# Patient Record
Sex: FEMALE | Race: ASIAN | NOT HISPANIC OR LATINO | Employment: FULL TIME
[De-identification: names, ages, dates, MRNs, and addresses within clinical notes are randomized per-mention and may not be internally consistent; named-entity substitution may affect disease eponyms.]

---

## 2017-07-08 ENCOUNTER — HEALTH MAINTENANCE LETTER (OUTPATIENT)
Age: 32
End: 2017-07-08

## 2020-10-12 ENCOUNTER — VIRTUAL VISIT (OUTPATIENT)
Dept: FAMILY MEDICINE | Facility: OTHER | Age: 35
End: 2020-10-12

## 2020-10-12 NOTE — PROGRESS NOTES
"Date: 10/12/2020 11:24:22  Clinician: Juan Alberto Saavedra  Clinician NPI: [id number]  Patient: Guille Abel  Patient : 1985  Patient Address: [address] [place] [address]  Patient Phone: [phone]  Visit Protocol: URI  Patient Summary:  Guille is a 35 year old ( : 1985 ) female who initiated a [facility] Visit for COVID-19 (Coronavirus) evaluation and screening. When asked the question \"Please sign me up to receive news, health information and promotions from [facility].\", Guille responded \"No\".    When asked when her symptoms started, Guille reported that she does not have any symptoms.   She denies taking antibiotic medication in the past month and having recent facial or sinus surgery in the past 60 days.    Pertinent COVID-19 (Coronavirus) information  In the past 14 days, Guille has not worked in a congregate living setting.   She does not work or volunteer as healthcare worker or a  and does not work or volunteer in a healthcare facility.   Guille also has not lived in a congregate living setting in the past 14 days. She does not live with a healthcare worker.   Guille has had a close contact with a laboratory-confirmed COVID-19 patient in the last 14 days. Additional information about contact with COVID-19 (Coronavirus) patient as reported by the patient (free text): My mother-in-law tested positive for COVID-19 on , Oct 11th. I was in contact with her days leading up to it.   Patient reported they are not living in the same household with a COVID-19 positive patient.  Patient was in an enclosed space for greater than 15 minutes with a COVID-19 patient.  Since 2019, Guille and has not had upper respiratory infection or influenza-like illness. Has not been diagnosed with lab-confirmed COVID-19 test   Pertinent medical history  Guille does not get yeast infections when she takes antibiotics.   Guille needs a return to work/school note.   Weight: 120 lbs   Guille does not smoke or use smokeless " tobacco.   She denies pregnancy and denies breastfeeding. She has menstruated in the past month.   Weight: 120 lbs    MEDICATIONS: No current medications, ALLERGIES: NKDA  Clinician Response:  Dear Guille,   Based on your exposure to COVID-19 (coronavirus), we would like to test you for this virus.  1. Please call [phone] to schedule your visit. Explain that you were referred by [facility] to have a COVID-19 test. Be ready to share your [facility] visit ID number.  The following will serve as your written order for this COVID Test, ordered by me, for the indication of suspected COVID [Z20.828]: The test will be ordered in [employer], our electronic health record, after you are scheduled. It will show as ordered and authorized by Lanre Dwyer MD.  Order: COVID-19 (coronavirus) PCR for ASYMPTOMATIC EXPOSURE testing from [facility].  If you know you have had close contact with someone who tested positive, you should be quarantined for 14 days after this exposure. You should stay in quarantine for the14 days even if the covid test is negative, the optimal time to test after exposure is 5-7 days from the exposure  Quarantine means   What should I do?  For safety, it's very important to follow these rules. Do this for 14 days after the date you were last exposed to the virus..  Stay home and away from others. Don't go to school or anywhere else. Generally quarantine means staying home from work but there are some exceptions to this. Please contact your workplace.   No hugging, kissing or shaking hands.  Don't let anyone visit.  Cover your mouth and nose with a mask, tissue or washcloth to avoid spreading germs.  Wash your hands and face often. Use soap and water.  What are the symptoms of COVID-19?  The most common symptoms are cough, fever and trouble breathing. Less common symptoms include headache, body aches, fatigue (feeling very tired), chills, sore throat, stuffy or runny nose, diarrhea (loose poop), loss of taste or smell, belly  pain, and nausea or vomiting (feeling sick to your stomach or throwing up).  After 14 days, if you have still don't have symptoms, you likely don't have this virus.  If you develop symptoms, follow these guidelines.  If you're normally healthy: Please start another [facility] visit to report your symptoms. Go to [email or web address].  If you have a serious health problem (like cancer, heart failure, an organ transplant or kidney disease): Call your specialty clinic. Let them know that you might have COVID-19.  2. When it's time for your COVID test:  Stay at least 6 feet away from others. (If someone will drive you to your test, stay in the backseat, as far away from the  as you can.)  Cover your mouth and nose with a mask, tissue or washcloth.  Go straight to the testing site. Don't make any stops on the way there or back.  Please note  Caregivers in these groups are at risk for severe illness due to COVID-19:  o People 65 years and older  o People who live in a nursing home or long-term care facility  o People with chronic disease (lung, heart, cancer, diabetes, kidney, liver, immunologic)  o People who have a weakened immune system, including those who:  Are in cancer treatment  Take medicine that weakens the immune system, such as corticosteroids  Had a bone marrow or organ transplant  Have an immune deficiency  Have poorly controlled HIV or AIDS  Are obese (body mass index of 40 or higher)  Smoke regularly  Where can I get more information?  [facility] -- About COVID-19: [email or web address]  CDC -- What to Do If You're Sick: www.cdc.gov/coronavirus/2019-ncov/about/steps-when-sick.html  CDC -- Ending Home Isolation: www.cdc.gov/coronavirus/2019-ncov/hcp/disposition-in-home-patients.html  CDC -- Caring for Someone: www.cdc.gov/coronavirus/2019-ncov/if-you-are-sick/care-for-someone.html  [facility] -- Interim Guidance for Hospital Discharge to Home:  www.health.[facility].[place]/diseases/coronavirus/hcp/hospdischarge.pdf  [facility] clinical trials (COVID-19 research studies): clinicalaffairs.[facility]/umn-clinical-trials  Below are the COVID-19 hotlines at the [facility] ([facility]). Interpreters are available.  For health questions: Call 268-360-6959 or 1-798.607.6281 (7 a.m. to 7 p.m.)  For questions about schools and childcare: Call 970-383-4409 or 1-503.975.1064 (7 a.m. to 7 p.m.)    Diagnosis: Contact with and (suspected) exposure to other viral communicable diseases  Diagnosis ICD: Z20.828

## 2021-10-06 ENCOUNTER — PRENATAL OFFICE VISIT (OUTPATIENT)
Dept: OBGYN | Facility: CLINIC | Age: 36
End: 2021-10-06
Payer: COMMERCIAL

## 2021-10-06 VITALS
DIASTOLIC BLOOD PRESSURE: 66 MMHG | SYSTOLIC BLOOD PRESSURE: 99 MMHG | OXYGEN SATURATION: 98 % | BODY MASS INDEX: 23.96 KG/M2 | HEIGHT: 62 IN | HEART RATE: 71 BPM | WEIGHT: 130.2 LBS

## 2021-10-06 DIAGNOSIS — Z34.81 ENCOUNTER FOR SUPERVISION OF OTHER NORMAL PREGNANCY, FIRST TRIMESTER: Primary | ICD-10-CM

## 2021-10-06 DIAGNOSIS — O09.521 MULTIGRAVIDA OF ADVANCED MATERNAL AGE IN FIRST TRIMESTER: ICD-10-CM

## 2021-10-06 DIAGNOSIS — O21.0 HYPEREMESIS GRAVIDARUM: ICD-10-CM

## 2021-10-06 LAB
ABO/RH(D): NORMAL
ANTIBODY SCREEN: NEGATIVE
ERYTHROCYTE [DISTWIDTH] IN BLOOD BY AUTOMATED COUNT: 12.1 % (ref 10–15)
HCT VFR BLD AUTO: 38.3 % (ref 35–47)
HGB BLD-MCNC: 13.2 G/DL (ref 11.7–15.7)
MCH RBC QN AUTO: 31 PG (ref 26.5–33)
MCHC RBC AUTO-ENTMCNC: 34.5 G/DL (ref 31.5–36.5)
MCV RBC AUTO: 90 FL (ref 78–100)
PLATELET # BLD AUTO: 208 10E3/UL (ref 150–450)
RBC # BLD AUTO: 4.26 10E6/UL (ref 3.8–5.2)
SPECIMEN EXPIRATION DATE: NORMAL
WBC # BLD AUTO: 5.6 10E3/UL (ref 4–11)

## 2021-10-06 PROCEDURE — 99207 PR FIRST OB VISIT: CPT | Performed by: OBSTETRICS & GYNECOLOGY

## 2021-10-06 PROCEDURE — 36415 COLL VENOUS BLD VENIPUNCTURE: CPT | Performed by: OBSTETRICS & GYNECOLOGY

## 2021-10-06 PROCEDURE — 85027 COMPLETE CBC AUTOMATED: CPT | Performed by: OBSTETRICS & GYNECOLOGY

## 2021-10-06 PROCEDURE — 87389 HIV-1 AG W/HIV-1&-2 AB AG IA: CPT | Performed by: OBSTETRICS & GYNECOLOGY

## 2021-10-06 PROCEDURE — 86900 BLOOD TYPING SEROLOGIC ABO: CPT | Performed by: OBSTETRICS & GYNECOLOGY

## 2021-10-06 PROCEDURE — 86803 HEPATITIS C AB TEST: CPT | Performed by: OBSTETRICS & GYNECOLOGY

## 2021-10-06 PROCEDURE — 86850 RBC ANTIBODY SCREEN: CPT | Performed by: OBSTETRICS & GYNECOLOGY

## 2021-10-06 PROCEDURE — 86901 BLOOD TYPING SEROLOGIC RH(D): CPT | Performed by: OBSTETRICS & GYNECOLOGY

## 2021-10-06 PROCEDURE — 87086 URINE CULTURE/COLONY COUNT: CPT | Performed by: OBSTETRICS & GYNECOLOGY

## 2021-10-06 PROCEDURE — 87591 N.GONORRHOEAE DNA AMP PROB: CPT | Performed by: OBSTETRICS & GYNECOLOGY

## 2021-10-06 PROCEDURE — 87491 CHLMYD TRACH DNA AMP PROBE: CPT | Performed by: OBSTETRICS & GYNECOLOGY

## 2021-10-06 PROCEDURE — 86780 TREPONEMA PALLIDUM: CPT | Performed by: OBSTETRICS & GYNECOLOGY

## 2021-10-06 PROCEDURE — 87340 HEPATITIS B SURFACE AG IA: CPT | Performed by: OBSTETRICS & GYNECOLOGY

## 2021-10-06 PROCEDURE — 86762 RUBELLA ANTIBODY: CPT | Performed by: OBSTETRICS & GYNECOLOGY

## 2021-10-06 RX ORDER — ONDANSETRON 4 MG/1
4 TABLET, FILM COATED ORAL EVERY 6 HOURS PRN
Qty: 30 TABLET | Refills: 1 | Status: SHIPPED | OUTPATIENT
Start: 2021-10-06 | End: 2021-11-10 | Stop reason: ALTCHOICE

## 2021-10-06 ASSESSMENT — PAIN SCALES - GENERAL: PAINLEVEL: NO PAIN (0)

## 2021-10-06 ASSESSMENT — MIFFLIN-ST. JEOR: SCORE: 1243.32

## 2021-10-06 NOTE — PROGRESS NOTES
"Guille is a 36 year old female, , B+ blood type, who is here today for her first OB visit but is unsure of her LMP.  She has had a positive home pregnancy test.  Her last recorded LMP was on 2021 but she cannot recall if she forgot to write down her August LMP or if she failed to have a period.  Therefore, will check an OB US next Monday to confirm dates and viability.  She is very nervous about having another miscarriage since this occurred in 2020 at around 4 weeks gestation.  So she requests to skip the pap and cervical cultures today since this could cause spotting and subsequent stress.  She is taking a PNV daily po and denies use of nicotine, etoh, or illicit drug abuse.  She c/o daily nausea so would like a script for an antiemetic sent to her pharmacy for relief.    ROS: Ten point review of systems was reviewed and negative except the above.    Gyn Hx:      Past Medical History:   Diagnosis Date     Acne     resolved     Past Surgical History:   Procedure Laterality Date     NO HISTORY OF SURGERY       Patient Active Problem List   Diagnosis     CARDIOVASCULAR SCREENING; LDL GOAL LESS THAN 160     Encounter for supervision of other normal pregnancy     Insufficient prenatal care     Anemia of mother, complicating pregnancy, childbirth, or the puerperium, unspecified as to episode of care(648.20)     Multigravida of advanced maternal age in first trimester       ALL/Meds: Her medication and allergy histories were reviewed and are documented in their appropriate chart areas.    SH: Reviewed and documented in the appropriate area of the chart.    FH: Her family history was reviewed and documented in its appropriate chart area.    PE: BP 99/66 (BP Location: Left arm, Patient Position: Chair, Cuff Size: Adult Regular)   Pulse 71   Ht 1.582 m (5' 2.28\")   Wt 59.1 kg (130 lb 3.2 oz)   LMP  (LMP Unknown)   SpO2 98%   Breastfeeding No   BMI 23.60 kg/m    Body mass index is 23.6 kg/m .    Urine " HCG was +  Hrt - RRR without murmur  Lungs - CTAB  Neck - supple without palpable mass  Breasts - patient not undressed  Abd - soft, gravid, nontender, unable to hear fhts with the doppler today  Pelvic - normal external female genitalia, normal vaginal mucosa, closed cervix without motion tenderness, gravid uterus beneath the pubic symphysis, no adnexal mass or tenderness  Ext - negative    A/P:   - I discussed with her nutrition and medication concerns related to pregnancy.  We discussed folic acid supplementation.  We reviewed prenatal care.  She is given the opportunity to ask questions and have them answered.    30 minutes were spent face to face with the patient today discussing her history, diagnosis, and follow-up plan as noted above.  Over 50% of the visit was spent in counseling and coordination of care.  Schedule an OB US to confirm her dates and fetal viability.  Refer to the genetic counselor at the [facility] office to discuss her genetic testing option due to her AMA status.  Send to lab for testing.  Sent a script for Zofran due to her c/o hyperemesis gravidarum - instructions on use were reviewed.  She is to continue taking a PNV daily po.  She prefers to hold off on collecting a pap smear - will plan to obtain this at her 6-week postpartum visit.    Total Visit Time: 30 minutes.    Orders Placed This Encounter   Procedures     US OB < 14 Weeks Single     CBC with platelets     Hepatitis B surface antigen     Hepatitis C antibody     HIV Antigen Antibody Combo     Rubella Antibody IgG     Treponema Abs w Reflex to RPR and Titer     Perinatology Referral     Adult Type and Screen     Ranjana Gamboa DO  FACOG, FACS

## 2021-10-06 NOTE — PATIENT INSTRUCTIONS
If you have any questions regarding your visit, Please contact your care team.    [employer] [facility]: [phone]      [facility] CLINIC HOURS TELEPHONE NUMBER   Ranjana Gamboa DO.    Mini -  Paty -     PRABHJOT Clark RN     Monday, Wednesday, [facility]  8:30a.m-5:00 p.m   [facility]  [address] [place] [address]  [phone] ask for [facility]    Imaging [phone]       Urgent Care locations:    [facility]   Monday-Friday   10 am - 8 pm  Saturday and Sunday   9 am - 5 pm    Monday-Friday   10 am - 8 pm  Saturday and Sunday   9 am - 5 pm   [phone]     [facility] Labor and Delivery:  [phone]    If you need a medication refill, please contact your pharmacy. Please allow 3 business days for your refill to be completed.  As always, Thank you for trusting us with your healthcare needs!

## 2021-10-07 LAB
C TRACH DNA SPEC QL NAA+PROBE: NEGATIVE
HBV SURFACE AG SERPL QL IA: NONREACTIVE
HCV AB SERPL QL IA: NONREACTIVE
HIV 1+2 AB+HIV1 P24 AG SERPL QL IA: NONREACTIVE
N GONORRHOEA DNA SPEC QL NAA+PROBE: NEGATIVE
RUBV IGG SERPL QL IA: 0.92 INDEX
RUBV IGG SERPL QL IA: ABNORMAL
T PALLIDUM AB SER QL: NONREACTIVE

## 2021-10-08 LAB — BACTERIA UR CULT: NO GROWTH

## 2021-10-11 ENCOUNTER — ANCILLARY PROCEDURE (OUTPATIENT)
Dept: ULTRASOUND IMAGING | Facility: CLINIC | Age: 36
End: 2021-10-11
Attending: OBSTETRICS & GYNECOLOGY
Payer: COMMERCIAL

## 2021-10-11 PROCEDURE — 76817 TRANSVAGINAL US OBSTETRIC: CPT | Performed by: RADIOLOGY

## 2021-10-11 PROCEDURE — 76801 OB US < 14 WKS SINGLE FETUS: CPT | Performed by: RADIOLOGY

## 2021-10-28 ENCOUNTER — TELEPHONE (OUTPATIENT)
Dept: OBGYN | Facility: CLINIC | Age: 36
End: 2021-10-28

## 2021-11-10 ENCOUNTER — PRENATAL OFFICE VISIT (OUTPATIENT)
Dept: OBGYN | Facility: CLINIC | Age: 36
End: 2021-11-10
Payer: COMMERCIAL

## 2021-11-10 VITALS
OXYGEN SATURATION: 99 % | WEIGHT: 125.8 LBS | HEART RATE: 75 BPM | BODY MASS INDEX: 22.8 KG/M2 | SYSTOLIC BLOOD PRESSURE: 105 MMHG | DIASTOLIC BLOOD PRESSURE: 67 MMHG

## 2021-11-10 DIAGNOSIS — Z23 NEED FOR PROPHYLACTIC VACCINATION AND INOCULATION AGAINST INFLUENZA: ICD-10-CM

## 2021-11-10 DIAGNOSIS — O21.0 HYPEREMESIS GRAVIDARUM: Primary | ICD-10-CM

## 2021-11-10 PROCEDURE — 90471 IMMUNIZATION ADMIN: CPT | Performed by: OBSTETRICS & GYNECOLOGY

## 2021-11-10 PROCEDURE — 90686 IIV4 VACC NO PRSV 0.5 ML IM: CPT | Performed by: OBSTETRICS & GYNECOLOGY

## 2021-11-10 PROCEDURE — 99207 PR PRENATAL VISIT: CPT | Performed by: OBSTETRICS & GYNECOLOGY

## 2021-11-10 RX ORDER — METOCLOPRAMIDE 10 MG/1
10 TABLET ORAL 4 TIMES DAILY PRN
Qty: 30 TABLET | Refills: 1 | Status: SHIPPED | OUTPATIENT
Start: 2021-11-10 | End: 2022-05-25

## 2021-11-10 NOTE — LETTER
[facility] [place]  [address]  [facility] [address]  Phone: [phone]    11/10/21    Guille Abel  [address]  [facility] [address]      To whom it may concern:     Guille Abel has been advised to avoid upcoming travel due to medical reasons so will need to cancel her trip, including her airline tickets, from March 9 through March 15, 2022.  Please call if you have any questions or concerns.      Sincerely,        Ranjana Gamboa DO  FACOG, FACS  Dept of OB/GYN  [phone]

## 2021-11-10 NOTE — PATIENT INSTRUCTIONS
If you have any questions regarding your visit, Please contact your care team.    [employer] [facility]: [phone]      [facility] CLINIC HOURS TELEPHONE NUMBER   Ranjana Gamboa .    SALVADOR Fisher -  Paty -       PRABHJOT Clark, RN  Rebecca, RN     Monday, Wednesday, Thursday and Friday, [place]  8:30a.m-5:00 p.m   [facility]  [address] [place] [address]  [phone] ask for [facility]    Imaging [phone]       Urgent Care locations:    [facility] Saturday and Sunday   9 am - 5 pm    Monday-Friday   12 pm - 8 pm  Saturday and Sunday   9 am - 5 pm   [phone]     [facility] Labor and Delivery:  [phone]    If you need a medication refill, please contact your pharmacy. Please allow 3 business days for your refill to be completed.  As always, Thank you for trusting us with your healthcare needs!

## 2021-11-10 NOTE — PROGRESS NOTES
She has not felt fetal movement yet but will record when this starts.  She would like to switch from Zofran to Reglan to see if this new medication will provide more relief from nausea.  She lost 5 lbs since her last visit because of this issue.  She has an appt with the genetic counselor scheduled due to AMA status and would like a flu vaccine today.  She will be due for the Moderna booster at her next prenatal visit and would like this.  She also requests a letter to obtain a refund for her airline tickets and trip to the [place] for March 9-15, 2022 since she is not sure that she will be comfortable taking an international trip at that time.  She denies any fluid leakage or regular uterine contractions.

## 2021-11-22 ENCOUNTER — TELEPHONE (OUTPATIENT)
Dept: OBGYN | Facility: CLINIC | Age: 36
End: 2021-11-22
Payer: COMMERCIAL

## 2021-11-22 NOTE — TELEPHONE ENCOUNTER
Patient is calling today with questions about the covid booster shot. She would like a call back as what Dr. Gamboa would recommend. Please advise. Thank you.

## 2021-11-23 NOTE — TELEPHONE ENCOUNTER
Unable to reach patient via phone. RN left a message and instructed patient to call the clinic at [phone].    Rebecca Mar RN on 11/23/2021 at 8:00 AM

## 2021-11-23 NOTE — TELEPHONE ENCOUNTER
Let pt know that our providers encourage all of their pregnant pt's to get the Covid booster shot.  Pt was wondering if she needed to stick with the Moderna booster since her Covid shots were Moderna.  I explained that we follow the CDCs guidelines and she can have whichever Covid booster, moderna or Pfizer.    Amber Aldridge RN

## 2021-12-08 ENCOUNTER — PRENATAL OFFICE VISIT (OUTPATIENT)
Dept: OBGYN | Facility: CLINIC | Age: 36
End: 2021-12-08
Payer: COMMERCIAL

## 2021-12-08 VITALS
WEIGHT: 126.2 LBS | DIASTOLIC BLOOD PRESSURE: 65 MMHG | SYSTOLIC BLOOD PRESSURE: 99 MMHG | HEART RATE: 65 BPM | BODY MASS INDEX: 22.87 KG/M2

## 2021-12-08 DIAGNOSIS — O09.522 MULTIGRAVIDA OF ADVANCED MATERNAL AGE IN SECOND TRIMESTER: ICD-10-CM

## 2021-12-08 PROCEDURE — 99207 PR PRENATAL VISIT: CPT | Performed by: OBSTETRICS & GYNECOLOGY

## 2021-12-08 NOTE — PATIENT INSTRUCTIONS
If you have any questions regarding your visit, Please contact your care team.    [employer] [facility]: [phone]      [facility] CLINIC HOURS TELEPHONE NUMBER   Ranjana Gamboa DO.    CABRERA Russell -  Paty -       Amber RN  Soniya, RN  Rebecca, RN     Monday, Wednesday, Thursday and Friday, [place]  8:30a.m-5:00 p.m   [facility]  [address] [place] [address]  [phone] ask for [facility]    Imaging [phone]       Urgent Care locations:    [facility] Saturday and Sunday   9 am - 5 pm    Monday-Friday   12 pm - 8 pm  Saturday and Sunday   9 am - 5 pm   [phone]     [facility] Labor and Delivery:  [phone]    If you need a medication refill, please contact your pharmacy. Please allow 3 business days for your refill to be completed.  As always, Thank you for trusting us with your healthcare needs!

## 2021-12-08 NOTE — PROGRESS NOTES
She has not felt fetal movement yet but will record when this begins.  She gained 1 lb since her last visit and denies any fluid leakage or regular uterine contractions.  She is measuring small for dates today and will be due for her level 2 OB US in 4 weeks 5 days so a referral to [facility] was placed.  She feels better and only takes Reglan prn/occassionally now for nausea.  She declines a med refill today.  She received her Moderna COVID-19 booster on 11/29/2021.

## 2022-01-04 ENCOUNTER — TRANSFERRED RECORDS (OUTPATIENT)
Dept: HEALTH INFORMATION MANAGEMENT | Facility: CLINIC | Age: 37
End: 2022-01-04
Payer: COMMERCIAL

## 2022-02-02 ENCOUNTER — PRENATAL OFFICE VISIT (OUTPATIENT)
Dept: OBGYN | Facility: CLINIC | Age: 37
End: 2022-02-02
Payer: COMMERCIAL

## 2022-02-02 VITALS
DIASTOLIC BLOOD PRESSURE: 61 MMHG | OXYGEN SATURATION: 100 % | HEART RATE: 60 BPM | SYSTOLIC BLOOD PRESSURE: 94 MMHG | BODY MASS INDEX: 24.43 KG/M2 | WEIGHT: 134.8 LBS

## 2022-02-02 DIAGNOSIS — O09.892 SUPERVISION OF OTHER HIGH RISK PREGNANCIES, SECOND TRIMESTER: Primary | ICD-10-CM

## 2022-02-02 PROCEDURE — 99207 PR PRENATAL VISIT: CPT | Performed by: OBSTETRICS & GYNECOLOGY

## 2022-02-02 NOTE — PROGRESS NOTES
She reports feeling reassuring daily fetal activity and will continue to record.  She gained 8 lbs since her last visit but this was 8 weeks ago so she was reminded to come in no later than every 4 weeks.  She has a growth OB US scheduled this month per the pt with MFJOANNE, due to measuring SGA.  She states that her other babies were all small at birth.  She was reminded that the diabetic screen will be checked at her next visit in 4 weeks.  She is not a rhogam candidate since her Rh factor is present.

## 2022-02-02 NOTE — PATIENT INSTRUCTIONS
If you have any questions regarding your visit, Please contact your care team.    [employer] [facility]: [phone]      [facility] CLINIC HOURS TELEPHONE NUMBER   Ranjana Gamboa DO.    CABRERA Russell -  Paty -       Ambre RN  Soniya, RN  Rebecca, RN     Monday, Wednesday, Thursday and Friday, [place]  8:30a.m-5:00 p.m   [facility]  [address] [place] [address]  [phone] ask for [facility]    Imaging [phone]       Urgent Care locations:    [facility] Saturday and Sunday   9 am - 5 pm    Monday-Friday   12 pm - 8 pm  Saturday and Sunday   9 am - 5 pm   [phone]     [facility] Labor and Delivery:  [phone]    If you need a medication refill, please contact your pharmacy. Please allow 3 business days for your refill to be completed.  As always, Thank you for trusting us with your healthcare needs!

## 2022-03-09 ENCOUNTER — PRENATAL OFFICE VISIT (OUTPATIENT)
Dept: OBGYN | Facility: CLINIC | Age: 37
End: 2022-03-09
Payer: COMMERCIAL

## 2022-03-09 VITALS
BODY MASS INDEX: 25.07 KG/M2 | SYSTOLIC BLOOD PRESSURE: 95 MMHG | WEIGHT: 138.31 LBS | DIASTOLIC BLOOD PRESSURE: 61 MMHG | HEART RATE: 70 BPM

## 2022-03-09 DIAGNOSIS — Z28.39 RUBELLA NON-IMMUNE STATUS, ANTEPARTUM: ICD-10-CM

## 2022-03-09 DIAGNOSIS — O09.521 MULTIGRAVIDA OF ADVANCED MATERNAL AGE IN FIRST TRIMESTER: Primary | ICD-10-CM

## 2022-03-09 DIAGNOSIS — O09.899 RUBELLA NON-IMMUNE STATUS, ANTEPARTUM: ICD-10-CM

## 2022-03-09 LAB
GLUCOSE 1H P 50 G GLC PO SERPL-MCNC: 182 MG/DL (ref 70–129)
HGB BLD-MCNC: 11.4 G/DL (ref 11.7–15.7)

## 2022-03-09 PROCEDURE — 82950 GLUCOSE TEST: CPT | Performed by: ADVANCED PRACTICE MIDWIFE

## 2022-03-09 PROCEDURE — 90715 TDAP VACCINE 7 YRS/> IM: CPT | Performed by: ADVANCED PRACTICE MIDWIFE

## 2022-03-09 PROCEDURE — 90471 IMMUNIZATION ADMIN: CPT | Performed by: ADVANCED PRACTICE MIDWIFE

## 2022-03-09 PROCEDURE — 36415 COLL VENOUS BLD VENIPUNCTURE: CPT | Performed by: ADVANCED PRACTICE MIDWIFE

## 2022-03-09 PROCEDURE — 99207 PR PRENATAL VISIT: CPT | Performed by: ADVANCED PRACTICE MIDWIFE

## 2022-03-09 NOTE — PROGRESS NOTES
Feeling well. No complaints.  Has had two MFM scans and has another planned.  GCT today and TDAP.   No contra/lof/vb  Boosted for COVID and has not had it.   Employer is considering having her work hybrid.  Encouraged to stay home if possible and if not to wear N95  Discussed rubella non immunne  RTC 2 weeks.   Julieth Whaley, APRN, CNM

## 2022-03-18 ENCOUNTER — LAB (OUTPATIENT)
Dept: LAB | Facility: CLINIC | Age: 37
End: 2022-03-18
Payer: COMMERCIAL

## 2022-03-18 DIAGNOSIS — O09.521 MULTIGRAVIDA OF ADVANCED MATERNAL AGE IN FIRST TRIMESTER: ICD-10-CM

## 2022-03-18 LAB
GESTATIONAL GTT 1 HR POST DOSE: 162 MG/DL (ref 60–179)
GESTATIONAL GTT 2 HR POST DOSE: 158 MG/DL (ref 60–154)
GESTATIONAL GTT 3 HR POST DOSE: 112 MG/DL (ref 60–139)
GLUCOSE P FAST SERPL-MCNC: 83 MG/DL (ref 60–94)

## 2022-03-18 PROCEDURE — 82952 GTT-ADDED SAMPLES: CPT

## 2022-03-18 PROCEDURE — 82951 GLUCOSE TOLERANCE TEST (GTT): CPT

## 2022-03-18 PROCEDURE — 36415 COLL VENOUS BLD VENIPUNCTURE: CPT

## 2022-04-08 ENCOUNTER — PRENATAL OFFICE VISIT (OUTPATIENT)
Dept: OBGYN | Facility: CLINIC | Age: 37
End: 2022-04-08
Payer: COMMERCIAL

## 2022-04-08 VITALS
WEIGHT: 141.6 LBS | OXYGEN SATURATION: 99 % | HEART RATE: 80 BPM | BODY MASS INDEX: 25.66 KG/M2 | SYSTOLIC BLOOD PRESSURE: 102 MMHG | DIASTOLIC BLOOD PRESSURE: 68 MMHG

## 2022-04-08 PROCEDURE — 99207 PR PRENATAL VISIT: CPT | Performed by: OBSTETRICS & GYNECOLOGY

## 2022-04-08 NOTE — PROGRESS NOTES
She continues to measure SGA and is being followed by [facility] for this issue with her next appt scheduled on 4/20/2022.  She states that all her babies have been small so is most likely constitutional.  She was pleased to know that she passed her 3-hour GTT.  He gained 3 lbs since her last visit and denies any fluid leakage or regular uterine contractions.

## 2022-04-08 NOTE — PATIENT INSTRUCTIONS
If you have any questions regarding your visit, Please contact your care team.    [facility]: [phone]      [facility] CLINIC HOURS TELEPHONE NUMBER   Ranjana Gamboa .    CABRERA Russell -  Paty -       Amber RN  Soniya, RN  Rebecca, PRABHJOT     [place]  Wednesday and Friday  8:30 a.m-5:00 p.m    [place]  Monday 8:30 a.m-5:00 p.m [facility]  [address] [place] [address]  [phone] ask for [facility]    Imaging [phone]    [facility]  [address] [facility] [place] [address]     Urgent Care locations:    [facility] Saturday and Sunday   9 am - 5 pm    Monday-Friday   10 am - 8 pm  Saturday and Sunday   9 am - 5 pm   [phone]     [facility] Labor and Delivery:  [phone]    If you need a medication refill, please contact your pharmacy. Please allow 3 business days for your refill to be completed.  As always, Thank you for trusting us with your healthcare needs!   __________see additional instructions from your care team below___________

## 2022-04-20 ENCOUNTER — PRENATAL OFFICE VISIT (OUTPATIENT)
Dept: OBGYN | Facility: CLINIC | Age: 37
End: 2022-04-20
Payer: COMMERCIAL

## 2022-04-20 VITALS
HEART RATE: 87 BPM | WEIGHT: 143.7 LBS | SYSTOLIC BLOOD PRESSURE: 99 MMHG | DIASTOLIC BLOOD PRESSURE: 67 MMHG | OXYGEN SATURATION: 97 % | BODY MASS INDEX: 26.04 KG/M2

## 2022-04-20 DIAGNOSIS — O43.193 MARGINAL INSERTION OF UMBILICAL CORD AFFECTING MANAGEMENT OF MOTHER IN THIRD TRIMESTER: ICD-10-CM

## 2022-04-20 PROCEDURE — 99207 PR PRENATAL VISIT: CPT | Performed by: OBSTETRICS & GYNECOLOGY

## 2022-04-20 NOTE — PATIENT INSTRUCTIONS
If you have any questions regarding your visit, Please contact your care team.    [employer] Services: [phone]    To Schedule an Appointment 24/7  Call: [phone] CL[facility] HOU[facility] TELEPHONE NUM[facility]   Ranjana Gamboa DO.    CABRERA Russell -  Paty -       Amber, RN  Soniya, RN  Rebecca, RN     [place]  Wednesday and Friday  8:30 a.m-5:00 p.m    [place]  Monday 8:30 a.m-5:00 p.m [facility]  [address] [place] [address]  [phone] ask for [facility]    Imaging Scheduling-All Locations [phone]    [facility]  [address] [facility] [place] [address]     Urgent Care locations:  [facility]   Monday-Friday   10 am - 8 pm  Saturday and Sunday   9 am - 5 pm   [phone]     [facility] Labor and Delivery:  [phone]    If you need a medication refill, please contact your pharmacy. Please allow 3 business days for your refill to be completed.  As always, Thank you for trusting us with your healthcare needs!  see additional instructions from your care team below

## 2022-04-20 NOTE — PROGRESS NOTES
She reports feeling reassuring daily fetal activity and will continue to record.  She gained 2 lbs since her last visit and denies any fluid leakage or regular uterine contractions.  She has her next MFM appt today at 3 pm due to measuring SGA as well as because of a marginal cord insertion and AMA status.  She does have a hx of previous infants who met the SGA criteria.  Will plan to check for GBS at her next visit in 2 weeks and she is aware.

## 2022-05-06 ENCOUNTER — PRENATAL OFFICE VISIT (OUTPATIENT)
Dept: OBGYN | Facility: CLINIC | Age: 37
End: 2022-05-06
Payer: COMMERCIAL

## 2022-05-06 VITALS
BODY MASS INDEX: 26.5 KG/M2 | HEART RATE: 72 BPM | WEIGHT: 146.2 LBS | SYSTOLIC BLOOD PRESSURE: 107 MMHG | OXYGEN SATURATION: 99 % | DIASTOLIC BLOOD PRESSURE: 62 MMHG | RESPIRATION RATE: 16 BRPM

## 2022-05-06 DIAGNOSIS — O43.193 MARGINAL INSERTION OF UMBILICAL CORD AFFECTING MANAGEMENT OF MOTHER IN THIRD TRIMESTER: ICD-10-CM

## 2022-05-06 PROCEDURE — 87653 STREP B DNA AMP PROBE: CPT | Performed by: OBSTETRICS & GYNECOLOGY

## 2022-05-06 PROCEDURE — 99207 PR PRENATAL VISIT: CPT | Performed by: OBSTETRICS & GYNECOLOGY

## 2022-05-06 NOTE — PATIENT INSTRUCTIONS
If you have any questions regarding your visit, Please contact your care team.    [employer] Services: [phone]    To Schedule an Appointment 24/7  Call: [phone] CL[facility] TELEPHONE NUM[facility]   Ranjana Gamboa DO.    CABRERA Russell -  Paty -       Amber, RN  Soniya, RN  Rebecca, RN     [place]  Wednesday and Friday  8:30 a.m-5:00 p.m    [place]  Monday 8:30 a.m-5:00 p.m [facility]  [address] [place] [address]  [phone] ask for [facility]    Imaging Scheduling-All Locations [phone]    [facility]  [address] [facility] [place] [address]     Urgent Care locations:  [facility]   Monday-Friday   10 am - 8 pm  Saturday and Sunday   9 am - 5 pm   [phone]     [facility] Labor and Delivery:  [phone]    If you need a medication refill, please contact your pharmacy. Please allow 3 business days for your refill to be completed.  As always, Thank you for trusting us with your healthcare needs!  see additional instructions from your care team below

## 2022-05-06 NOTE — PROGRESS NOTES
She reports feeling reassuring daily fetal activity and will continue to record.  She gained 3 lbs since her last visit and denies any fluid leakage or regular uterine contractions.  She has an upcoming follow up appt with MAREK on 5/19/2022 and continues to measure SGA.  However, all three of her infants measured SGA so she is not worried.  She also is aware that she has a marginal cord insertion.  Her GBS culture was obtained and submitted today.  Her cervix remains unchanged and unfavorable.

## 2022-05-07 LAB — GP B STREP DNA SPEC QL NAA+PROBE: POSITIVE

## 2022-05-18 ENCOUNTER — PRENATAL OFFICE VISIT (OUTPATIENT)
Dept: OBGYN | Facility: CLINIC | Age: 37
End: 2022-05-18
Payer: COMMERCIAL

## 2022-05-18 VITALS
DIASTOLIC BLOOD PRESSURE: 68 MMHG | OXYGEN SATURATION: 98 % | HEART RATE: 73 BPM | WEIGHT: 147.3 LBS | SYSTOLIC BLOOD PRESSURE: 108 MMHG | BODY MASS INDEX: 26.7 KG/M2

## 2022-05-18 DIAGNOSIS — O09.529 SUPERVISION OF HIGH-RISK PREGNANCY OF ELDERLY MULTIGRAVIDA: Primary | ICD-10-CM

## 2022-05-18 DIAGNOSIS — O99.820 GROUP B STREPTOCOCCUS CARRIER, +RV CULTURE, CURRENTLY PREGNANT: ICD-10-CM

## 2022-05-18 DIAGNOSIS — O43.193 MARGINAL INSERTION OF UMBILICAL CORD AFFECTING MANAGEMENT OF MOTHER IN THIRD TRIMESTER: ICD-10-CM

## 2022-05-18 PROCEDURE — 99207 PR PRENATAL VISIT: CPT | Performed by: OBSTETRICS & GYNECOLOGY

## 2022-05-18 NOTE — PATIENT INSTRUCTIONS
If you have any questions regarding your visit, Please contact your care team.    [employer] Services: [phone]    To Schedule an Appointment 24/7  Call: [phone] HOU[facility] TELEPHONE NUMBER   Ranjana Gamboa DO.    CABRERA Russell -Surgery Scheduler  Paty - Surgery Scheduler      Amber, RN  Soniya, RN  Rebecca, PRABHJOT     [place]  Wednesday and Friday  8:30 a.m-5:00 p.m    [place]-Temporary  Monday 8:30 a.m-5:00 p.m    Typical Surgery day:  Tuesday [facility]  [address] [place] [address] [phone] Phone  [phone] Fax    Imaging Scheduling-All Locations [phone]    [facility]  [address] [place] [address]     Urgent Care locations:  [facility]   Monday-Friday   10 am - 8 pm  Saturday and Sunday   9 am - 5 pm   [phone]   **Surgeries** Our Surgery Schedulers will contact you to schedule. If you do not receive a call within 3 business days, please call [phone].    [facility] Labor and Delivery:  [phone]    If you need a medication refill, please contact your pharmacy. Please allow 3 business days for your refill to be completed.  As always, Thank you for trusting us with your healthcare needs!  see additional instructions from your care team below

## 2022-05-18 NOTE — PROGRESS NOTES
She reports feeling reassuring daily fetal activity and will continue to record. She gained 1 lb since her last visit and denies any fluid leakage or regular uterine contractions.  She has her next M appt tomorrow for a growth US since she has a known marginal cord insertion and has been measuring SGA.  She continues to measure SGA today but states that her other 3 babies measured SGA so she is not concerned.  She was surprised to learn that her GBS culture was + since she had not accessed My Chart yet.  We discussed this diagnosis and her need for IV antibiotic during labor.  Her cervix remains unchanged and unfavorable.

## 2022-05-25 ENCOUNTER — PRENATAL OFFICE VISIT (OUTPATIENT)
Dept: OBGYN | Facility: CLINIC | Age: 37
End: 2022-05-25
Payer: COMMERCIAL

## 2022-05-25 VITALS
DIASTOLIC BLOOD PRESSURE: 72 MMHG | HEART RATE: 66 BPM | BODY MASS INDEX: 27.05 KG/M2 | WEIGHT: 149.25 LBS | SYSTOLIC BLOOD PRESSURE: 112 MMHG

## 2022-05-25 DIAGNOSIS — O09.523 MULTIGRAVIDA OF ADVANCED MATERNAL AGE IN THIRD TRIMESTER: Primary | ICD-10-CM

## 2022-05-25 PROCEDURE — 99207 PR PRENATAL VISIT: CPT | Performed by: ADVANCED PRACTICE MIDWIFE

## 2022-05-25 NOTE — PROGRESS NOTES
39w2d  Feels ok.  Some left sided pain with walking.   Discussed induction at 41 weeks.   Had MFM scan last week with adequate growth.   Fetal movement: positive  Denies vaginal bleeding/lof, some contractions  Warning signs reviewed   Labor signs and symptoms discussed    BPP ordered for next week.   Return to clinic 1 week    Julieth Wahley, DON, BRIGIDM

## 2022-06-18 ENCOUNTER — HEALTH MAINTENANCE LETTER (OUTPATIENT)
Age: 37
End: 2022-06-18

## 2022-07-20 ENCOUNTER — PRENATAL OFFICE VISIT (OUTPATIENT)
Dept: OBGYN | Facility: CLINIC | Age: 37
End: 2022-07-20
Payer: COMMERCIAL

## 2022-07-20 VITALS
OXYGEN SATURATION: 97 % | DIASTOLIC BLOOD PRESSURE: 65 MMHG | WEIGHT: 130 LBS | BODY MASS INDEX: 23.56 KG/M2 | HEART RATE: 69 BPM | SYSTOLIC BLOOD PRESSURE: 100 MMHG

## 2022-07-20 DIAGNOSIS — Z12.4 SCREENING FOR CERVICAL CANCER: ICD-10-CM

## 2022-07-20 PROCEDURE — 87624 HPV HI-RISK TYP POOLED RSLT: CPT | Performed by: OBSTETRICS & GYNECOLOGY

## 2022-07-20 PROCEDURE — 99207 PR POST PARTUM EXAM: CPT | Performed by: OBSTETRICS & GYNECOLOGY

## 2022-07-20 PROCEDURE — G0145 SCR C/V CYTO,THINLAYER,RESCR: HCPCS | Performed by: OBSTETRICS & GYNECOLOGY

## 2022-07-20 ASSESSMENT — PATIENT HEALTH QUESTIONNAIRE - PHQ9: SUM OF ALL RESPONSES TO PHQ QUESTIONS 1-9: 1

## 2022-07-20 NOTE — PATIENT INSTRUCTIONS
If you have any questions regarding your visit, Please contact your care team.    [email or web address] Services: [phone]    To Schedule an Appointment 24/7  Call: [phone] HOURS TELEPHONE NUMBER   Ranjana Gamboa DO.    CABRERA Russell -Surgery Scheduler  Paty - Surgery Scheduler    Amber, PRABHJOT Tobar, RN  Rebecca, PRABHJOT   [place]  Wednesday and Friday  8:30 a.m-5:00 p.m    [place]-Temporary  Monday 8:30 a.m-5:00 p.m  Typical Surgery day:  Tuesday [facility]  [address] [place] [address] [phone] Phone  [phone] Fax    Imaging Scheduling-All Locations [phone]    [facility]  [address] [place] [address]     Urgent Care locations:  [facility] Monday-Friday   10 am - 8 pm  Saturday and Sunday   9 am - 5 pm [phone]   **Surgeries** Our Surgery Schedulers will contact you to schedule. If you do not receive a call within 3 business days, please call [phone].    [facility] Labor and Delivery:  [phone]    If you need a medication refill, please contact your pharmacy. Please allow 3 business days for your refill to be completed.  As always, Thank you for trusting us with your healthcare needs!  see additional instructions from your care team below

## 2022-07-20 NOTE — PROGRESS NOTES
Guille is here for a postpartum checkup.  She is a 36 y/o female, , who is bottle-feeding and denies any issues after a  on 2022.  She declines a contraceptive consult today and her first menses after delivery began on 2022.    She had a   OB History    Para Term  AB Living   5 4 4 0 1 4   SAB IAB Ectopic Multiple Live Births   0 0 0 0 4      # Outcome Date GA Lbr Chavez/2nd Weight Sex Delivery Anes PTL Lv   5 Term 22 40w0d 13:09 / 00:06 2.778 kg (6 lb 2 oz) F Vag-Spont IV, EPI N CAR      Name: REJI,BABYGIRL      Apgar1: 9  Apgar5: 10   4 AB 2020           3 Term 11/12/15 40w4d  2.778 kg (6 lb 2 oz) F  None N CAR      Complications: Hemorrhage      Name: Juana      Apgar1: 8  Apgar5: 9   2 Term 12 40w0d  2.637 kg (5 lb 13 oz) F Vag-Spont   CAR      Name: Nan Friedman   1 Term 04/07/10 40w0d 24:00 2.325 kg (5 lb 2 oz) F  None  CAR      Birth Comments: none      Name: Merissa      Since delivery, she has not been breast feeding.  She has had a normal menses.  She has not had intercourse.  Patient screened for postpartum depression and complaints are NEGATIVE. Screening has also been completed for intimate partner violence. She would like to discuss obtaining a pap smear toda since she is overdue x 4 years.  She denies any hx of abnormal pap results, however.      PE: /65 (BP Location: Right arm, Patient Position: Sitting, Cuff Size: Adult Regular)   Pulse 69   Wt 59 kg (130 lb)   LMP 2022 (Exact Date)   SpO2 97%   BMI 23.56 kg/m    Body mass index is 23.56 kg/m .    General Appearance:  healthy, alert, active, no distress  Cardiovascular:  Regular rate and Rhythm without murmur  Lungs:  Clear, without wheeze, rale or rhonchi   Breasts:  The patient declined an exam of her breasts today.  Abdomen: Benign, Soft, flat, non-tender, No masses, organomegaly, No inguinal nodes and Bowel sounds normoactive.   Pelvic:       - Ext: Vulva and perineum are normal  without lesion, mass or discharge        - Bladder: no tenderness, no masses       - Vagina: Normal mucosa, blood in vault consistent with current menses       - Cervix: normal and multiparous       - Uterus:Normal shape, position and consistencyfirm, nontender, nongravid uterus without CMT       - Adnexa: Normal without masses or tenderness       - Rectal: deferred    A/P  Routine Postpartum Exam, Pap Collection for Cervical Cancer Screening     - I discussed the new pap recommendations regarding screening.  Explained the rationale for increased intervals between paps.  Questions asked and answered.  She does agree to this regiment.   - Pap was performed and submitted to lab since she is 4 years overdue for this.  Her last pap was collected on 3/27/2015.     - Contraception: She declines a consult and does not want to discuss.    30 minutes were spent today in chart review, the patient visit, review of tests, and documentation in regard to the issues noted above.    Ranjana Gamboa DO  FACOG, FACS

## 2022-07-25 LAB
BKR LAB AP GYN ADEQUACY: NORMAL
BKR LAB AP GYN INTERPRETATION: NORMAL
BKR LAB AP HPV REFLEX: NORMAL
BKR LAB AP LMP: NORMAL
BKR LAB AP PREVIOUS ABNORMAL: NORMAL
PATH REPORT.COMMENTS IMP SPEC: NORMAL
PATH REPORT.COMMENTS IMP SPEC: NORMAL
PATH REPORT.RELEVANT HX SPEC: NORMAL

## 2022-07-26 LAB
HUMAN PAPILLOMA VIRUS 16 DNA: NEGATIVE
HUMAN PAPILLOMA VIRUS 18 DNA: NEGATIVE
HUMAN PAPILLOMA VIRUS FINAL DIAGNOSIS: NORMAL
HUMAN PAPILLOMA VIRUS OTHER HR: NEGATIVE

## 2022-10-30 ENCOUNTER — HEALTH MAINTENANCE LETTER (OUTPATIENT)
Age: 37
End: 2022-10-30

## 2023-07-01 ENCOUNTER — HEALTH MAINTENANCE LETTER (OUTPATIENT)
Age: 38
End: 2023-07-01

## 2023-10-09 ENCOUNTER — OFFICE VISIT (OUTPATIENT)
Dept: FAMILY MEDICINE | Facility: CLINIC | Age: 38
End: 2023-10-09
Payer: COMMERCIAL

## 2023-10-09 VITALS
WEIGHT: 130.6 LBS | OXYGEN SATURATION: 100 % | BODY MASS INDEX: 24.03 KG/M2 | RESPIRATION RATE: 18 BRPM | HEIGHT: 62 IN | SYSTOLIC BLOOD PRESSURE: 110 MMHG | TEMPERATURE: 98.1 F | HEART RATE: 69 BPM | DIASTOLIC BLOOD PRESSURE: 73 MMHG

## 2023-10-09 DIAGNOSIS — Z00.00 ROUTINE GENERAL MEDICAL EXAMINATION AT A HEALTH CARE FACILITY: Primary | ICD-10-CM

## 2023-10-09 DIAGNOSIS — R05.2 SUBACUTE COUGH: ICD-10-CM

## 2023-10-09 PROCEDURE — 99213 OFFICE O/P EST LOW 20 MIN: CPT | Mod: 25 | Performed by: FAMILY MEDICINE

## 2023-10-09 PROCEDURE — 90480 ADMN SARSCOV2 VAC 1/ONLY CMP: CPT | Performed by: FAMILY MEDICINE

## 2023-10-09 PROCEDURE — 91320 SARSCV2 VAC 30MCG TRS-SUC IM: CPT | Performed by: FAMILY MEDICINE

## 2023-10-09 PROCEDURE — 99395 PREV VISIT EST AGE 18-39: CPT | Mod: 25 | Performed by: FAMILY MEDICINE

## 2023-10-09 RX ORDER — BENZONATATE 100 MG/1
100 CAPSULE ORAL 3 TIMES DAILY PRN
Qty: 30 CAPSULE | Refills: 0 | Status: SHIPPED | OUTPATIENT
Start: 2023-10-09 | End: 2024-05-30

## 2023-10-09 ASSESSMENT — ENCOUNTER SYMPTOMS
JOINT SWELLING: 0
HEMATOCHEZIA: 0
CONSTIPATION: 0
FEVER: 0
HEADACHES: 0
NAUSEA: 0
DYSURIA: 0
HEMATURIA: 0
WEAKNESS: 0
MYALGIAS: 0
BREAST MASS: 0
DIZZINESS: 0
SHORTNESS OF BREATH: 0
CHILLS: 0
NERVOUS/ANXIOUS: 0
PARESTHESIAS: 0
ARTHRALGIAS: 0
DIARRHEA: 0
FREQUENCY: 0
ABDOMINAL PAIN: 0
EYE PAIN: 0
COUGH: 1
PALPITATIONS: 0
SORE THROAT: 0
HEARTBURN: 0

## 2023-10-09 ASSESSMENT — PAIN SCALES - GENERAL: PAINLEVEL: NO PAIN (0)

## 2023-10-09 NOTE — PROGRESS NOTES
SUBJECTIVE:   CC: Guille is an 38 year old who presents for preventive health visit.       10/9/2023     3:59 PM   Additional Questions   Roomed by Lorena       Healthy Habits:     Getting at least 3 servings of Calcium per day:  NO    Bi-annual eye exam:  Yes    Dental care twice a year:  Yes    Sleep apnea or symptoms of sleep apnea:  None    Diet:  Regular (no restrictions)    Frequency of exercise:  2-3 days/week    Duration of exercise:  15-30 minutes    Taking medications regularly:  Yes    Medication side effects:  None    Additional concerns today:  No      Today's PHQ-2 Score:       10/9/2023     9:47 AM   PHQ-2 ( 1999 [employer])   Q1: Little interest or pleasure in doing things 0   Q2: Feeling down, depressed or hopeless 0   PHQ-2 Score 0   Q1: Little interest or pleasure in doing things Not at all   Q2: Feeling down, depressed or hopeless Not at all   PHQ-2 Score 0                   Have you ever done Advance Care Planning? (For example, a Health Directive, POLST, or a discussion with a medical provider or your loved ones about your wishes): No, advance care planning information given to patient to review.  Patient declined advance care planning discussion at this time.    Social History     Tobacco Use    Smoking status: Never    Smokeless tobacco: Never   Substance Use Topics    Alcohol use: No             10/9/2023     9:46 AM   Alcohol Use   Prescreen: >3 drinks/day or >7 drinks/week? No          No data to display              Reviewed orders with patient.  Reviewed health maintenance and updated orders accordingly - Yes  Lab work is in process    Breast Cancer Screening:        10/9/2023     9:49 AM   Breast CA Risk Assessment (FHS-7)   Do you have a family history of breast, colon, or ovarian cancer? No / Unknown         Patient under 40 years of age: Routine Mammogram Screening not recommended.   Pertinent mammograms are reviewed under the imaging tab.    History of abnormal Pap smear: NO - age 30-65  "PAP every 5 years with negative HPV co-testing recommended      Latest Ref Rng & Units 7/20/2022     3:40 PM 9/21/2009    12:00 AM   PAP / HPV   PAP  Negative for Intraepithelial Lesion or Malignancy (NILM)     PAP (Historical)   NIL    HPV 16 DNA Negative Negative     HPV 18 DNA Negative Negative     Other HR HPV Negative Negative       Reviewed and updated as needed this visit by clinical staff   Tobacco  Allergies  Meds              Reviewed and updated as needed this visit by Provider                     Review of Systems   Constitutional:  Negative for chills and fever.   HENT:  Negative for congestion, ear pain, hearing loss and sore throat.    Eyes:  Negative for pain and visual disturbance.   Respiratory:  Positive for cough. Negative for shortness of breath.    Cardiovascular:  Negative for chest pain, palpitations and peripheral edema.   Gastrointestinal:  Negative for abdominal pain, constipation, diarrhea, heartburn, hematochezia and nausea.   Breasts:  Negative for tenderness, breast mass and discharge.   Genitourinary:  Negative for dysuria, frequency, genital sores, hematuria, pelvic pain, urgency, vaginal bleeding and vaginal discharge.   Musculoskeletal:  Negative for arthralgias, joint swelling and myalgias.   Skin:  Negative for rash.   Neurological:  Negative for dizziness, weakness, headaches and paresthesias.   Psychiatric/Behavioral:  Negative for mood changes. The patient is not nervous/anxious.           OBJECTIVE:   /73 (BP Location: Left arm, Patient Position: Sitting, Cuff Size: Adult Regular)   Pulse 69   Temp 98.1  F (36.7  C) (Oral)   Resp 18   Ht 1.584 m (5' 2.36\")   Wt 59.2 kg (130 lb 9.6 oz)   LMP 10/02/2023 (Approximate)   SpO2 100%   BMI 23.61 kg/m    Physical Exam  GENERAL: healthy, alert and no distress  NECK: no adenopathy, no asymmetry, masses, or scars and thyroid normal to palpation  RESP: lungs clear to auscultation - no rales, rhonchi or wheezes  CV: " regular rate and rhythm, normal S1 S2, no S3 or S4, no murmur, click or rub, no peripheral edema and peripheral pulses strong  ABDOMEN: soft, nontender, no hepatosplenomegaly, no masses and bowel sounds normal  MS: no gross musculoskeletal defects noted, no edema    Diagnostic Test Results:  Labs reviewed in Epic    ASSESSMENT/PLAN:   (Z00.00) Routine general medical examination at a health care facility  (primary encounter diagnosis)  -Vitals stable, depression screening normal.  -Up-to-date on Pap.  Next Pap in 2027.    Plan: CBC with platelets, Comprehensive metabolic         panel (BMP + Alb, Alk Phos, ALT, AST, Total.         Bili, TP), Lipid panel reflex to direct LDL         Fasting            (R05.2) Subacute cough  -Guille with history of allergies has been having lingering cough for the past 2 to 3 weeks.  Especially at night.  Waking 1 or 2 times at night.  -Suggested imaging, Guille preferred to hold.  -Recommended to try cough suppressants at night, warm water with honey.  -If issues still persist after 2 to 3 weeks, she will let me know through MyChart to order chest x-ray.    Plan: benzonatate (TESSALON) 100 MG capsule            Patient has been advised of split billing requirements and indicates understanding: Yes      COUNSELING:  Reviewed preventive health counseling, as reflected in patient instructions       Regular exercise       Healthy diet/nutrition        She reports that she has never smoked. She has never used smokeless tobacco.          Lio Peters MD  [facility]

## 2024-04-20 NOTE — TELEPHONE ENCOUNTER
Left detailed message. Referral faxed 10/7 if she hasn't heard from [facility] she should reach out to schedule, gave number to pt and to call us if there are any issues or if they did not receive referral    Brina Gusman MA   
[facility]    Phone Message    May a detailed message be left on voicemail: yes     Reason for Call: The patient is requesting a call to discuss the process for scheduling genetic testing and if she is to contact [facility] or if they will contact her.      Action Taken: Message routed to:  [facility] p [phone]    Travel Screening: Not Applicable                                                                      
Patient returned call, per [facility] they haven't received referral. Will re-fax     Brina Gusman MA   
Alert-The patient is alert, awake and responds to voice. The patient is oriented to time, place, and person. The triage nurse is able to obtain subjective information.

## 2024-05-30 ENCOUNTER — OFFICE VISIT (OUTPATIENT)
Dept: FAMILY MEDICINE | Facility: CLINIC | Age: 39
End: 2024-05-30
Payer: COMMERCIAL

## 2024-05-30 ENCOUNTER — ANCILLARY PROCEDURE (OUTPATIENT)
Dept: GENERAL RADIOLOGY | Facility: CLINIC | Age: 39
End: 2024-05-30
Attending: PREVENTIVE MEDICINE
Payer: COMMERCIAL

## 2024-05-30 VITALS
HEART RATE: 90 BPM | SYSTOLIC BLOOD PRESSURE: 111 MMHG | BODY MASS INDEX: 23.57 KG/M2 | RESPIRATION RATE: 16 BRPM | OXYGEN SATURATION: 100 % | HEIGHT: 63 IN | DIASTOLIC BLOOD PRESSURE: 62 MMHG | WEIGHT: 133 LBS

## 2024-05-30 DIAGNOSIS — M54.59 MECHANICAL LOW BACK PAIN: Primary | ICD-10-CM

## 2024-05-30 DIAGNOSIS — M54.59 MECHANICAL LOW BACK PAIN: ICD-10-CM

## 2024-05-30 DIAGNOSIS — L23.9 ALLERGIC DERMATITIS: ICD-10-CM

## 2024-05-30 PROCEDURE — 72100 X-RAY EXAM L-S SPINE 2/3 VWS: CPT | Mod: TC | Performed by: RADIOLOGY

## 2024-05-30 PROCEDURE — 99213 OFFICE O/P EST LOW 20 MIN: CPT | Performed by: PREVENTIVE MEDICINE

## 2024-05-30 RX ORDER — TRIAMCINOLONE ACETONIDE 5 MG/G
OINTMENT TOPICAL
Qty: 30 G | Refills: 0 | Status: SHIPPED | OUTPATIENT
Start: 2024-05-30

## 2024-05-30 ASSESSMENT — PAIN SCALES - GENERAL: PAINLEVEL: NO PAIN (0)

## 2024-05-30 NOTE — RESULT ENCOUNTER NOTE
Guille,     X-rays of the low back are not showing any concerning bony abnormalities.  No arthritic changes seen.  Plan of care and follow-up as discussed in clinic    Please do not hesitate to call us at [phone] if you have any questions or concerns.    Thank you,    Barbra Cantu MD MPH

## 2024-05-30 NOTE — PROGRESS NOTES
Assessment & Plan     Mechanical low back pain  -Intermittent symptoms for 2 years  -No prior imaging, hence check x-rays today  -Referral to physical therapy provided  -If symptoms do not improve with 4 to 6 weeks of physical therapy then consider referral to the spine clinic  - XR Lumbar Spine 2/3 Views  - Physical Therapy  Referral    Allergic dermatitis  -Topical steroid prescribed  -Emollients if needed  -Cetirizine or loratadine as needed for itching  -Avoid applying any additional medication on affected area  -If symptoms are not improving in 2 to 3 weeks, then consider dermatology referral  - triamcinolone (KENALOG) 0.5 % external ointment  Dispense: 30 g; Refill: 0              Subjective   Guille is a 38 year old, presenting for the following health issues:  Derm Problem (Rash)        5/30/2024     1:43 PM   Additional Questions   Roomed by Paulette   Accompanied by self       Low back pain for 2 years  Radiates up  No leg pain or soreness  Has to hunch her back to make the pain better  No trauma  No surgeries  First started when pregnant at 8 months   Intermittent+  Takes herbal medicine and this helps  The patient does not have any focal weakness or numbness, no urine or stool incontinence, no hematuria, no saddle anesthesia and no gait abnormalities.       History of Present Illness       Back Pain:  She presents for follow up of back pain. Patient's back pain is a recurring problem.  Location of back pain:  Right lower back and left lower back  Description of back pain: sharp and shooting  Back pain spreads: nowhere    Since patient first noticed back pain, pain is: gradually worsening  Does back pain interfere with her job:  Yes       Reason for visit:  Back pain and rash  Symptom onset:  3-7 days ago    She eats 2-3 servings of fruits and vegetables daily.She consumes 1 sweetened beverage(s) daily.She exercises with enough effort to increase her heart rate 10 to 19 minutes per day.  She  "exercises with enough effort to increase her heart rate 3 or less days per week.   She is taking medications regularly.    Patient would also like to discuss her lower back pain that has been present for 2 years that radiates up the spine and its a shooting sharp pain       Rash  Onset/Duration: a week   Description  Location: lower back   Character: raised  Itching: moderate  Intensity:  moderate  Progression of Symptoms:  worsening  Accompanying signs and symptoms:   Fever: No  Body aches or joint pain: No  Sore throat symptoms: No  Recent cold symptoms: No  History:           Previous episodes of similar rash: None  New exposures:  Herbal lotion   Recent travel: No  Exposure to similar rash: No  Precipitating or alleviating factors: benadryl cream  Therapies tried and outcome: The cream helped a little bit but it darkened her skin         Objective    /62 (BP Location: Left arm, Patient Position: Sitting, Cuff Size: Adult Regular)   Pulse 90   Resp 16   Ht 1.6 m (5' 3\")   Wt 60.3 kg (133 lb)   LMP 05/13/2024   SpO2 100%   BMI 23.56 kg/m    Body mass index is 23.56 kg/m .  Physical Exam   GENERAL APPEARANCE: healthy, alert and no distress  EYES: Eyes grossly normal to inspection and conjunctivae and sclerae normal  RESP: lungs clear to auscultation - no rales, rhonchi or wheezes  CV: regular rates and rhythm, normal S1 S2, no S3 or S4 and no murmur, click or rub  ABDOMEN: soft, non-tender and no rebound or guarding   MS: extremities normal- no gross deformities noted and peripheral pulses normal  SKIN: There is enlarged erythematous patch noted on the mid low back, no blisters, no drainage, no tenderness, no increase in temperature, irregular margins with some scaling noted, the rash appears to be in the areas where the herbal medication was applied  NEURO: Normal strength and tone, mentation intact and speech normal, gait normal, able to heel and toe walk.   PSYCH: mentation appears normal    Lumbar " spine: No swelling, bruising, erythema atrophy or deformity.  No tenderness noted on palpation of spinous processes.  Range of motion of the lumbar spine is full in all directions without focal deficit.  Strength is full.  SLR negative bilaterally.  Distal motor and sensory exam is intact.  Reflexes are 2+ and symmetrical.  Distal pulses intact. No sacroiliac tenderness bilaterally.  Great toe extension normal.       No results found for this or any previous visit (from the past 24 hour(s)).        Signed Electronically by: Barbra Cantu MD MPH

## 2024-05-30 NOTE — PATIENT INSTRUCTIONS
Referral Details    Referred By  Referred To   Barbra Cantu MD [address] [facility] [address]   Phone: [phone]   Fax: [phone]    Diagnoses: Mechanical low back pain   Order: Physical Therapy  Referral       Comment: Please be aware that coverage of these services is subject to the terms and limitations of your health insurance plan.  Call member services at your health plan with any benefit or coverage questions.   [facility] will call you to coordinate your care as prescribed by your provider. If you don't hear from a representative within 2 business days, please call [phone].

## 2024-09-09 ENCOUNTER — PATIENT OUTREACH (OUTPATIENT)
Dept: CARE COORDINATION | Facility: CLINIC | Age: 39
End: 2024-09-09
Payer: COMMERCIAL

## 2024-09-23 ENCOUNTER — PATIENT OUTREACH (OUTPATIENT)
Dept: CARE COORDINATION | Facility: CLINIC | Age: 39
End: 2024-09-23
Payer: COMMERCIAL

## 2024-12-28 ENCOUNTER — HEALTH MAINTENANCE LETTER (OUTPATIENT)
Age: 39
End: 2024-12-28